# Patient Record
Sex: FEMALE | URBAN - METROPOLITAN AREA
[De-identification: names, ages, dates, MRNs, and addresses within clinical notes are randomized per-mention and may not be internally consistent; named-entity substitution may affect disease eponyms.]

---

## 2021-11-01 ENCOUNTER — OFFICE VISIT (OUTPATIENT)
Dept: URBAN - METROPOLITAN AREA CLINIC 82 | Facility: CLINIC | Age: 61
End: 2021-11-01
Payer: SELF-PAY

## 2021-11-01 ENCOUNTER — LAB OUTSIDE AN ENCOUNTER (OUTPATIENT)
Dept: URBAN - METROPOLITAN AREA CLINIC 82 | Facility: CLINIC | Age: 61
End: 2021-11-01

## 2021-11-01 DIAGNOSIS — Z86.010 PERSONAL HISTORY OF COLONIC POLYPS: ICD-10-CM

## 2021-11-01 DIAGNOSIS — R19.5 HEME POSITIVE STOOL: ICD-10-CM

## 2021-11-01 PROBLEM — 428283002: Status: ACTIVE | Noted: 2021-11-01

## 2021-11-01 PROCEDURE — 99202 OFFICE O/P NEW SF 15 MIN: CPT | Performed by: INTERNAL MEDICINE

## 2021-11-01 RX ORDER — ATORVASTATIN CALCIUM 20 MG/1
1 TABLET TABLET, FILM COATED ORAL ONCE A DAY
Status: ACTIVE | COMMUNITY

## 2021-11-01 RX ORDER — LOSARTAN POTASSIUM 50 MG/1
1 TABLET TABLET ORAL ONCE A DAY
Status: ACTIVE | COMMUNITY

## 2021-11-01 RX ORDER — POLYETHYLENE GLYCOL 3350 17 G/17G
AS DIRECTED PRIOR TO COLONOSCOPY POWDER, FOR SOLUTION ORAL ONCE
Qty: 238  GRAM | Refills: 0 | OUTPATIENT
Start: 2021-11-01 | End: 2021-11-02

## 2021-11-01 NOTE — HPI-TODAY'S VISIT:
60-year-old  female here the office for the evaluation of heme positive stool.  Patient has had colonoscopy 2016 that showed a small polyp through Dr. Villegas.  Denies any rectal pain or rectal bleeding.  She was noted to have hemorrhoids in the past.  Patient also was noted to have colon polyp in 2016 and was recommended to have a colonoscopy in 5 years.  Patient reports heme positive stool on recent exam denies any epigastric abdominal pain nausea or vomiting.  Denies any unintentional weight loss denies dysphagia.

## 2021-12-10 ENCOUNTER — TELEPHONE ENCOUNTER (OUTPATIENT)
Dept: URBAN - METROPOLITAN AREA CLINIC 82 | Facility: CLINIC | Age: 61
End: 2021-12-10

## 2021-12-13 ENCOUNTER — OFFICE VISIT (OUTPATIENT)
Dept: URBAN - METROPOLITAN AREA SURGERY CENTER 13 | Facility: SURGERY CENTER | Age: 61
End: 2021-12-13
Payer: SELF-PAY

## 2021-12-13 DIAGNOSIS — Z12.11 AVERAGE RISK FOR CRC. DUE TO PT'S CO-MORBID STATE WITH END STAGE DEMENTIA, HIGH RISK FOR ANESTHESIA (PER NEUROLOGY); INABILITY TO TAKE A BOWEL PREP....WOULD NOT ADVISE ANY COLORECTAL CANCER SCREENING INCLUDING STOOL TEST FOR FECAL BLOOD.: ICD-10-CM

## 2021-12-13 PROCEDURE — G0121 COLON CA SCRN NOT HI RSK IND: HCPCS | Performed by: INTERNAL MEDICINE

## 2021-12-13 PROCEDURE — G8907 PT DOC NO EVENTS ON DISCHARG: HCPCS | Performed by: INTERNAL MEDICINE

## 2023-10-25 ENCOUNTER — OFFICE VISIT (OUTPATIENT)
Dept: URBAN - METROPOLITAN AREA CLINIC 98 | Facility: CLINIC | Age: 63
End: 2023-10-25
Payer: SELF-PAY

## 2023-10-25 VITALS
DIASTOLIC BLOOD PRESSURE: 76 MMHG | HEIGHT: 62 IN | HEART RATE: 76 BPM | WEIGHT: 167.6 LBS | SYSTOLIC BLOOD PRESSURE: 129 MMHG | TEMPERATURE: 97.2 F | BODY MASS INDEX: 30.84 KG/M2

## 2023-10-25 DIAGNOSIS — K21.9 GERD WITHOUT ESOPHAGITIS: ICD-10-CM

## 2023-10-25 DIAGNOSIS — R10.32 LLQ ABDOMINAL PAIN: ICD-10-CM

## 2023-10-25 DIAGNOSIS — E66.09 OTHER OBESITY DUE TO EXCESS CALORIES: ICD-10-CM

## 2023-10-25 DIAGNOSIS — K80.20 ASYMPTOMATIC CHOLELITHIASIS: ICD-10-CM

## 2023-10-25 DIAGNOSIS — K57.90 DIVERTICULOSIS: ICD-10-CM

## 2023-10-25 DIAGNOSIS — I25.10 ATHEROSCLEROSIS OF NATIVE CORONARY ARTERY OF NATIVE HEART WITHOUT ANGINA PECTORIS: ICD-10-CM

## 2023-10-25 PROCEDURE — 99214 OFFICE O/P EST MOD 30 MIN: CPT | Performed by: INTERNAL MEDICINE

## 2023-10-25 RX ORDER — ATORVASTATIN CALCIUM 20 MG/1
1 TABLET TABLET, FILM COATED ORAL ONCE A DAY
Status: ACTIVE | COMMUNITY

## 2023-10-25 RX ORDER — LOSARTAN POTASSIUM 50 MG/1
1 TABLET TABLET ORAL ONCE A DAY
Status: ACTIVE | COMMUNITY

## 2023-10-25 NOTE — HPI-TODAY'S VISIT:
61 yo pt here for evaluation of abdominal pain. She c/o of L-LBP w radiation to the LLQ, w lower abdominal pressure, mild obstipation wo urologic sxs and no melenic stools nor hematochezia. Denies fever, chills nor night sweats. No anorexia or weight loss. Colonoscopy w Dr Sabillon 12/21: L-diverticulosis an E - Hrrds. Labs 5/23: normal CBC, CMP, UA and lipid profile. A + P CT: HH, coronary calcifications, gallstones wo inflammation or obstruction, R-renal cyst and L-diverticulosis wo diverticulitis. She has no cardiorespiratory nor constitutional sxs. Had a normal XTT 6/20. NO recent cardiac evaluation.  No other complaints after extensive ROS.

## 2023-10-27 ENCOUNTER — DASHBOARD ENCOUNTERS (OUTPATIENT)
Age: 63
End: 2023-10-27

## 2023-10-27 PROBLEM — 162864005: Status: ACTIVE | Noted: 2023-10-27

## 2023-10-27 PROBLEM — 266435005: Status: ACTIVE | Noted: 2023-10-27

## 2023-10-27 PROBLEM — 722596001: Status: ACTIVE | Noted: 2023-10-27

## 2023-10-27 PROBLEM — 443502000: Status: ACTIVE | Noted: 2023-10-27

## 2023-10-27 PROBLEM — 266474003: Status: ACTIVE | Noted: 2023-10-27

## 2023-10-27 PROBLEM — 397881000: Status: ACTIVE | Noted: 2023-10-27
